# Patient Record
Sex: MALE | Race: WHITE
[De-identification: names, ages, dates, MRNs, and addresses within clinical notes are randomized per-mention and may not be internally consistent; named-entity substitution may affect disease eponyms.]

---

## 2020-12-30 ENCOUNTER — HOSPITAL ENCOUNTER (OUTPATIENT)
Dept: HOSPITAL 92 - LABBT | Age: 70
Discharge: HOME | End: 2020-12-30
Attending: ORTHOPAEDIC SURGERY
Payer: MEDICARE

## 2020-12-30 DIAGNOSIS — Z20.828: ICD-10-CM

## 2020-12-30 DIAGNOSIS — Z01.818: Primary | ICD-10-CM

## 2020-12-30 DIAGNOSIS — M75.102: ICD-10-CM

## 2020-12-30 LAB
ANION GAP SERPL CALC-SCNC: 14 MMOL/L (ref 10–20)
BASOPHILS # BLD AUTO: 0.1 10X3/UL (ref 0–0.2)
BASOPHILS NFR BLD AUTO: 1.1 % (ref 0–2)
BUN SERPL-MCNC: 17 MG/DL (ref 8.4–25.7)
CALCIUM SERPL-MCNC: 9.1 MG/DL (ref 7.8–10.44)
CHLORIDE SERPL-SCNC: 104 MMOL/L (ref 98–107)
CO2 SERPL-SCNC: 27 MMOL/L (ref 23–31)
CREAT CL PREDICTED SERPL C-G-VRATE: 0 ML/MIN (ref 70–130)
EOSINOPHIL # BLD AUTO: 0.3 10X3/UL (ref 0–0.5)
EOSINOPHIL NFR BLD AUTO: 4.3 % (ref 0–6)
GLUCOSE SERPL-MCNC: 94 MG/DL (ref 83–110)
HGB BLD-MCNC: 14 G/DL (ref 14–18)
LYMPHOCYTES NFR BLD AUTO: 27.9 % (ref 18–47)
MCH RBC QN AUTO: 31.6 PG (ref 27–33)
MCV RBC AUTO: 97.1 FL (ref 80–100)
MONOCYTES # BLD AUTO: 0.7 10X3/UL (ref 0–1.1)
MONOCYTES NFR BLD AUTO: 9.2 % (ref 0–10)
NEUTROPHILS # BLD AUTO: 4.5 10X3/UL (ref 1.5–8.4)
NEUTROPHILS NFR BLD AUTO: 57.2 % (ref 40–75)
PLATELET # BLD AUTO: 239 10X3/UL (ref 130–400)
POTASSIUM SERPL-SCNC: 4.6 MMOL/L (ref 3.5–5.1)
RBC # BLD AUTO: 4.43 10X6/UL (ref 4.4–5.8)
SODIUM SERPL-SCNC: 140 MMOL/L (ref 136–145)
WBC # BLD AUTO: 7.9 10X3/UL (ref 4.5–11)

## 2020-12-30 PROCEDURE — 93010 ELECTROCARDIOGRAM REPORT: CPT

## 2020-12-30 PROCEDURE — 87635 SARS-COV-2 COVID-19 AMP PRB: CPT

## 2020-12-30 PROCEDURE — U0003 INFECTIOUS AGENT DETECTION BY NUCLEIC ACID (DNA OR RNA); SEVERE ACUTE RESPIRATORY SYNDROME CORONAVIRUS 2 (SARS-COV-2) (CORONAVIRUS DISEASE [COVID-19]), AMPLIFIED PROBE TECHNIQUE, MAKING USE OF HIGH THROUGHPUT TECHNOLOGIES AS DESCRIBED BY CMS-2020-01-R: HCPCS

## 2020-12-30 PROCEDURE — 85025 COMPLETE CBC W/AUTO DIFF WBC: CPT

## 2020-12-30 PROCEDURE — 80048 BASIC METABOLIC PNL TOTAL CA: CPT

## 2020-12-30 PROCEDURE — 93005 ELECTROCARDIOGRAM TRACING: CPT

## 2021-01-04 ENCOUNTER — HOSPITAL ENCOUNTER (OUTPATIENT)
Dept: HOSPITAL 92 - SDC | Age: 71
Discharge: HOME | End: 2021-01-04
Attending: ORTHOPAEDIC SURGERY
Payer: MEDICARE

## 2021-01-04 VITALS — BODY MASS INDEX: 28.5 KG/M2

## 2021-01-04 DIAGNOSIS — Z79.899: ICD-10-CM

## 2021-01-04 DIAGNOSIS — S46.212A: Primary | ICD-10-CM

## 2021-01-04 DIAGNOSIS — M25.812: ICD-10-CM

## 2021-01-04 DIAGNOSIS — K21.9: ICD-10-CM

## 2021-01-04 DIAGNOSIS — S46.012A: ICD-10-CM

## 2021-01-04 DIAGNOSIS — Z79.82: ICD-10-CM

## 2021-01-04 DIAGNOSIS — K22.70: ICD-10-CM

## 2021-01-04 DIAGNOSIS — G89.18: ICD-10-CM

## 2021-01-04 PROCEDURE — C1713 ANCHOR/SCREW BN/BN,TIS/BN: HCPCS

## 2021-01-04 PROCEDURE — 0LS30ZZ REPOSITION RIGHT UPPER ARM TENDON, OPEN APPROACH: ICD-10-PCS | Performed by: ORTHOPAEDIC SURGERY

## 2021-01-04 PROCEDURE — A4306 DRUG DELIVERY SYSTEM <=50 ML: HCPCS

## 2021-01-04 PROCEDURE — 23430 REPAIR BICEPS TENDON: CPT

## 2021-01-04 PROCEDURE — 23410 REPAIR ROTATOR CUFF ACUTE: CPT

## 2021-01-04 PROCEDURE — 97139 UNLISTED THERAPEUTIC PX: CPT

## 2021-01-04 PROCEDURE — S0028 INJECTION, FAMOTIDINE, 20 MG: HCPCS

## 2021-01-04 PROCEDURE — 64416 NJX AA&/STRD BRCH PL NFS IMG: CPT

## 2021-01-04 PROCEDURE — 0LM10ZZ REATTACHMENT OF RIGHT SHOULDER TENDON, OPEN APPROACH: ICD-10-PCS | Performed by: ORTHOPAEDIC SURGERY

## 2021-01-04 PROCEDURE — 3E0T3BZ INTRODUCTION OF ANESTHETIC AGENT INTO PERIPHERAL NERVES AND PLEXI, PERCUTANEOUS APPROACH: ICD-10-PCS | Performed by: ORTHOPAEDIC SURGERY

## 2021-01-05 NOTE — OP
DATE OF PROCEDURE:  01/04/2021



PREOPERATIVE DIAGNOSIS:  Left shoulder impingement, large to massive rotator cuff

tear, and biceps tendon tearing and instability. 



POSTOPERATIVE DIAGNOSES:  Left shoulder impingement, large to massive rotator cuff

tear, and biceps tendon tearing and instability. 



PROCEDURES PERFORMED:  Left shoulder open subacromial decompression, followed by

open rotator cuff repair, followed by open biceps tenodesis. 



ASSISTANT:  FRANCISCO Finley. The assistant surgeon was present throughout the

procedure to include the approach to the shoulder, the repair of the rotator cuff

and performance of the biceps tenodesis, the repair of the deltoid to the acromion

and finally final closure of all shoulder wounds. 



BLOOD LOSS:  100 mL.



ANESTHESIA:  The patient did have a general anesthetic as well as preoperative block.



IMPLANTS:  We used a two triple-loaded and one double-loaded titanium rotator cuff

anchor.  We used a 7 x 23 BioComposite tenodesis screw.  We used two BioComposite

4.75 mm SwiveLock anchors. 



DISPOSITION:  He went to recovery room in stable condition.



INDICATIONS:  A 70-year-old male who has had some problems with the shoulder for a

while, but since the late Fall, has had significant difficulty raising arm and has

had weakness.  At this time, he is presenting for repair of rotator cuff tear. 



DESCRIPTION OF PROCEDURE:  After all appropriate consent forms were explained and

signed, he was taken to operating room and at this time was given general

anesthetic.  Once the level of anesthesia was appropriate, he was placed in a

modified beach chair position with all bony prominences well padded.  The left

shoulder and upper extremity were then prepped and draped in standard surgical

fashion.  A 10 blade was used to incise down through skin only.  Bovie was used to

clear any brisk venous bleeding.  We then made ourselves a traveling window to be

able to visualize our shoulder.  We then took full-thickness myofascial flap to take

down the anterior portion of the deltoid, going inline with a visible raphe in the

muscle laterally.  The muscle of the deltoid itself was split with a finger inline

with the fibers.  This was taken laterally down approximately 2 cm only.  At this

time, we then peeled the tissue off, exposing the anterior acromion.  A Francis was

placed underneath the anterior acromion and a saw was used to perform an anterior

acromioplasty.  The inferior acromioplasty was then performed manually with a rasp.

At this time, we then removed our bursal tissue, gaining access to our rotator cuff

tendon.  At this time, we then visualized our tendon, placing some traction sutures

in the tendon and mobilizing the tendon from the superior and inferior surfaces.

Once this was done, it was apparent that the patient had torn in an L-fashion, where

the longitudinal line had gone basically at the rotator interval and the tendon had

torn off its insertion throughout the entire supra and most of the infraspinatus and

then traveled intertendinously back toward the teres.  At this time, we first went

ahead and performed our biceps tenodesis.  A stitch was placed in the biceps tendon.

 Curved Hairston scissors were used to cut off the biceps tendon from its insertion

superiorly.  At this time, we then sutured our biceps tendon, cut off the

intra-articular portion and removed it from the field.  The transverse humeral

ligament had been opened up and any brisk venous bleeding was coagulated.  The

bicipital groove was found to be clean.  A pin was placed.  We reamed with a 7 mm

reamer to a depth of 25 mm, and a 7 x 23 BioComposite Bio-Tenodesis screw was placed

in standard fashion.  Sutures were tied over top, so the screw could not back out.

Once this was done, we then performed reapproximation of our anterior edge of our

rotator cuff tear to the leading edge of the subscapularis.  This was done with a

couple of simple Ethibond sutures.  Once this was done, we then placed a couple of

Ethibond sutures to close our intratendinous tearing posteriorly, and once this was

done, we then removed all soft tissue off our tuberosity insertion site. We then

freshened up the edge of our rotator cuff in its entirety.  We then placed two

triple loaded and one double loaded rotator cuff anchors right off the articular

edge of the bone.  We then ran these sutures through our rotator cuff tear in

mattress fashion and finally tied all the sutures.  The most anterior anchor was cut

and left out of the double row repair.  The two triple loaded anchors were then

passed through with a free needle through a small wedge of soft tissue that was

still attached to the bone lateral to our repair off the articular edge and these

sutures were then split and placed into two 4.75 mm SwiveLocks for a double-row

repair.  At this time, we then took our shoulder through full range of motion,

finding an anatomic repair, being able to repair the entirety of the rotator cuff

tear with excellent stability and no motion whatsoever.  The patient had excellent

external rotation and full forward flexion at the table.  At this time, we

thoroughly irrigated and dried our wound.  We then placed three sets of Ethibond

sutures through the acromion to we primarily repaired the deltoid back to bone.  We

then over-ran this with a large Vicryl, finishing our deltoid repair.  We then

thoroughly irrigated and dried some more.  We then used 2-0 Vicryl and surgical

staples to close our incision.  A small sterile dressing was applied and the patient

then had his left upper extremity placed in a sling prior to waking him up.  The

patient was then awakened and he was taken to recovery room in stable condition.

All counts were correct at the end of the case and he did receive preoperative IV

antibiotics. 







Job ID:  [id number]